# Patient Record
(demographics unavailable — no encounter records)

---

## 2024-11-07 NOTE — IMAGING
[de-identified] : LSPINE Palpation: + tenderness to palpation in midline low back and lumbar paraspinal musculature No bilateral thoracic, no SI joint tenderness to palpation ROM: Full ROM with mild stinffness on flexion and extension Strength: 5/5 bilateral hip flexors, knee extensors, ankle dorsiflexors, EHL, ankle plantarflexors Sensation: Sensation present to light touch bilateral L2-S1 distributions Provocative maneuvers: + R straight leg raise

## 2024-11-07 NOTE — WORK
[Other: ___] : [unfilled] [Was the competent medical cause of the injury] : was the competent medical cause of the injury [Are consistent with the injury] : are consistent with the injury [Consistent with my objective findings] : consistent with my objective findings [Partial] : partial [Does not reveal pre-existing condition(s) that may affect treatment/prognosis] : does not reveal pre-existing condition(s) that may affect treatment/prognosis [Cannot return to work because ________] : cannot return to work because [unfilled]
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
## 2024-11-07 NOTE — ASSESSMENT
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  Advised to clear medication with internist or PCP if any concurrent health problem with heart, blood pressure, or GI system exists.  Gabapentin- Patient advised of sedating effects, instructed not to drive, operate machinery, or take with other sedating medications. Advised of need to taper on/off medication and risk of abruptly stopping gabapentin.
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
## 2025-01-16 NOTE — IMAGING
[de-identified] : LSPINE Palpation: + tenderness to palpation in midline low back and lumbar paraspinal musculature No bilateral thoracic, no SI joint tenderness to palpation ROM: Full ROM with mild stinffness on flexion and extension Strength: 5/5 bilateral hip flexors, knee extensors, ankle dorsiflexors, EHL, ankle plantarflexors Sensation: Sensation present to light touch bilateral L2-S1 distributions Provocative maneuvers: + R straight leg raise
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
## 2025-01-16 NOTE — IMAGING
[de-identified] : LSPINE Palpation: + tenderness to palpation in midline low back and lumbar paraspinal musculature No bilateral thoracic, no SI joint tenderness to palpation ROM: Full ROM with mild stinffness on flexion and extension Strength: 5/5 bilateral hip flexors, knee extensors, ankle dorsiflexors, EHL, ankle plantarflexors Sensation: Sensation present to light touch bilateral L2-S1 distributions Provocative maneuvers: + R straight leg raise
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
## 2025-03-27 NOTE — IMAGING
[de-identified] : LSPINE Palpation: + tenderness to palpation in midline low back and lumbar paraspinal musculature No bilateral thoracic, no SI joint tenderness to palpation ROM: Full ROM with mild stinffness on flexion and extension Strength: 5/5 bilateral hip flexors, knee extensors, ankle dorsiflexors, EHL, ankle plantarflexors Sensation: Sensation present to light touch bilateral L2-S1 distributions Provocative maneuvers: + R straight leg raise

## 2025-04-10 NOTE — DATA REVIEWED
[FreeTextEntry1] : Right X-Ray Examination of the SHOULDER 2 views:  no fractures, subluxations or dislocations.   X-Ray Examination of the SCAPULA 1 or 2 views shows: there is an acromial spur     2 v C spine: neg for fx or dislocation  IMPRESSION:  MRI of the right shoulder demonstrates: 1.  There is type II SLAP tear (detachment of the superior labrum and biceps anchor from the superior glenoid). There are multiple paralabral cysts seen. One measures 5 mm adjacent to the posterior superior labrum. Another measures 3 mm adjacent to the anterosuperior labrum. 2.  Tendinosis  of the intraarticular portion of the biceps tendon.   3.  Moderate tenosynovitis of the biceps tendon in the bicipital groove. 4.  Thickening of the coracohumeral ligament which can be seen with adhesive capsulitis or rotator interval injury. 5.  Supraspinatus and infraspinatus tendinosis. 6.  Subacromial/subdeltoid bursitis.

## 2025-04-10 NOTE — WORK
[Sprain/Strain] : sprain/strain [Was the competent medical cause of the injury] : was the competent medical cause of the injury [Are consistent with the injury] : are consistent with the injury [Consistent with my objective findings] : consistent with my objective findings [Partial] : partial [Does not reveal pre-existing condition(s) that may affect treatment/prognosis] : does not reveal pre-existing condition(s) that may affect treatment/prognosis [Can return to work with limitations on ______] : can return to work with limitations on [unfilled] [Lifting] : lifting [Operating heavy equipment] : operating heavy equipment [Pulling/Pushing] : pulling/pushing [Reaching overhead] : reaching overhead [Reaching at/below shoulder level] : reaching at or below shoulder level [Operation of motor vehicles] : operation of motor vehicles [Unknown at this time] : : unknown at this time [Patient] : patient [No Rx restrictions] : No Rx restrictions. [I provided the services listed above] :  I provided the services listed above. [Sedentary Work:] : Sedentary Work: Exerting up to 10 pounds of force occasionally and/or a negligible amount of force frequently to lift, carry, push, pull or otherwise move objects, including the human body. Sedentary work involves sitting most of the time, but may involve walking or standing for brief periods of time. Jobs are sedentary if walking and standing are required only occasionally and all other sedentary criteria are met. [FreeTextEntry1] : optimistic

## 2025-04-10 NOTE — IMAGING
[de-identified] : RIGHT SHOULDER  Inspection: No swelling.   Palpation: Tenderness is noted at the bicipital groove, anterior and lateral.   Range of motion: There is pain with range of motion.  , ER 55, @90ER 90, @90IR 30  Strength: There is pain and discomfort with strength testing.  Forward Flexion 4/5. Abduction 4/5.  External Rotation 5-/5 and Internal Rotation 5/5   Neurological testings: motor and sensor intact distally.  Ligament Stability and Special Tests:   There is positive arc of pain.   Shoulder apprehension: neg  Shoulder relocation: neg  Obriens test: pos  Biceps Active test: neg  Silverio Labral Shear: neg  Impingement testing: pos  Bryant testing: pos  Whipple: pos  Cross Body Adduction: neg
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
## 2025-04-10 NOTE — DISCUSSION/SUMMARY
[de-identified] : We discussed their diagnosis and treatment options at length. We will first attempt conservative treatment with a course of PT and anti-inflammatory medication. The patient was provided with a prescription to work on scapular strengthening and rotator cuff strengthening on the impingement syndrome protocol. We also discussed the possible of a corticosteroid injection in the future in order to help decrease inflammation and pain so that they can perform better therapy.    Follow up in 6 weeks to re-evaluate progress with therapy  next visit: if no improvement consider CSI R shoulder

## 2025-05-29 NOTE — IMAGING
[de-identified] : LSPINE Palpation: + tenderness to palpation in midline low back and lumbar paraspinal musculature No bilateral thoracic, no SI joint tenderness to palpation ROM: Full ROM with mild stinffness on flexion and extension Strength: 5/5 bilateral hip flexors, knee extensors, ankle dorsiflexors, EHL, ankle plantarflexors Sensation: Sensation present to light touch bilateral L2-S1 distributions Provocative maneuvers: + R straight leg raise

## 2025-05-29 NOTE — ASSESSMENT
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  c/w PT, new rx provided.  Will resume gabapentin at this time given persistent radicular symptoms Massage/acupuncture F/up in 2 months   NSAIDs- Patient warned of risk of medication to GI tract, increased blood pressure, cardiac risk, and risk of fluid retention.  Advised to clear medication with internist or PCP if any concurrent health problem with heart, blood pressure, or GI system exists. Will renew meloxicam today  Gabapentin- Patient advised of sedating effects, instructed not to drive, operate machinery, or take with other sedating medications. Advised of need to taper on/off medication and risk of abruptly stopping gabapentin.
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
## 2025-06-13 NOTE — DISCUSSION/SUMMARY
[de-identified] : We discussed their diagnosis and treatment options at length. We will first attempt conservative treatment with a course of PT and anti-inflammatory medication. The patient was provided with a prescription to work on scapular strengthening and rotator cuff strengthening on the impingement syndrome protocol. We also discussed the possible of a corticosteroid injection in the future in order to help decrease inflammation and pain so that they can perform better therapy.    Follow up in 6 weeks to re-evaluate progress with therapy  next visit: if no improvement consider CSI R shoulder *** 6.13 Improving subacromial bursitis, rotator cuff tendinitis Rec: Mobic rx given,  PT rx renewed discussed r/b of csi R shoulder, deferred Demonstrated HEP  RTC 6 weeks  next visit consider csi if not improvigin

## 2025-06-13 NOTE — IMAGING
[de-identified] : RIGHT SHOULDER  Inspection: No swelling.   Palpation: Tenderness is noted at the bicipital groove, anterior and lateral.   Range of motion: There is pain with range of motion.  , ER 55, @90ER 90, @90IR 30  Strength: There is pain and discomfort with strength testing.  Forward Flexion 4/5. Abduction 4/5.  External Rotation 5-/5 and Internal Rotation 5/5   Neurological testings: motor and sensor intact distally.  Ligament Stability and Special Tests:   There is positive arc of pain.   Shoulder apprehension: neg  Shoulder relocation: neg  Obriens test: pos  Biceps Active test: neg  Silverio Labral Shear: neg  Impingement testing: pos  Bryant testing: pos  Whipple: pos  Cross Body Adduction: neg
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
## 2025-07-28 NOTE — DISCUSSION/SUMMARY
[de-identified] : We discussed their diagnosis and treatment options at length. We will first attempt conservative treatment with a course of PT and anti-inflammatory medication. The patient was provided with a prescription to work on scapular strengthening and rotator cuff strengthening on the impingement syndrome protocol. We also discussed the possible of a corticosteroid injection in the future in order to help decrease inflammation and pain so that they can perform better therapy.    Follow up in 6 weeks to re-evaluate progress with therapy  next visit: if no improvement consider CSI R shoulder *** 6.13 Improving subacromial bursitis, rotator cuff tendinitis Rec: Mobic rx given,  PT rx renewed discussed r/b of csi R shoulder, deferred Demonstrated HEP  RTC 6 weeks  next visit consider csi if not improving  *** 7/28/25 subacromial bursitis, rotator cuff tendinitis Rec: Mobic rx given,  PT rx renewed discussed r/b of csi R shoulder, deferred FU PRN
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
## 2025-07-28 NOTE — IMAGING
[de-identified] : RIGHT SHOULDER  Inspection: No swelling.   Palpation: Tenderness is noted at the bicipital groove, anterior and lateral.   Range of motion: There is pain with range of motion.  , ER 55, @90ER 90, @90IR 30  Strength: There is pain and discomfort with strength testing.  Forward Flexion 4/5. Abduction 4/5.  External Rotation 5-/5 and Internal Rotation 5/5   Neurological testings: motor and sensor intact distally.  Ligament Stability and Special Tests:   There is positive arc of pain.   Shoulder apprehension: neg  Shoulder relocation: neg  Obriens test: pos  Biceps Active test: neg  Silverio Labral Shear: neg  Impingement testing: pos  Bryant testing: pos  Whipple: pos  Cross Body Adduction: neg
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
## 2025-07-29 NOTE — IMAGING
[de-identified] : LSPINE Palpation: + tenderness to palpation in midline low back and lumbar paraspinal musculature No bilateral thoracic, no SI joint tenderness to palpation ROM: Full ROM with mild stinffness on flexion and extension Strength: 5/5 bilateral hip flexors, knee extensors, ankle dorsiflexors, EHL, ankle plantarflexors Sensation: Sensation present to light touch bilateral L2-S1 distributions Provocative maneuvers: + R straight leg raise

## 2025-07-29 NOTE — ASSESSMENT
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  c/w PT, new rx provided.  C/w meloxicam 15mg and krystle 200mg F/up in 2 months   NSAIDs- Patient warned of risk of medication to GI tract, increased blood pressure, cardiac risk, and risk of fluid retention.  Advised to clear medication with internist or PCP if any concurrent health problem with heart, blood pressure, or GI system exists. Will renew meloxicam today  Gabapentin- Patient advised of sedating effects, instructed not to drive, operate machinery, or take with other sedating medications. Advised of need to taper on/off medication and risk of abruptly stopping gabapentin.